# Patient Record
Sex: MALE | Race: WHITE | URBAN - METROPOLITAN AREA
[De-identification: names, ages, dates, MRNs, and addresses within clinical notes are randomized per-mention and may not be internally consistent; named-entity substitution may affect disease eponyms.]

---

## 2023-09-04 ENCOUNTER — HOSPITAL ENCOUNTER (EMERGENCY)
Age: 43
Discharge: HOME | End: 2023-09-04
Attending: EMERGENCY MEDICINE
Payer: COMMERCIAL

## 2023-09-04 ENCOUNTER — APPOINTMENT (OUTPATIENT)
Dept: GENERAL RADIOLOGY | Age: 43
End: 2023-09-04
Payer: COMMERCIAL

## 2023-09-04 VITALS
DIASTOLIC BLOOD PRESSURE: 87 MMHG | HEART RATE: 67 BPM | BODY MASS INDEX: 19.62 KG/M2 | OXYGEN SATURATION: 97 % | SYSTOLIC BLOOD PRESSURE: 106 MMHG | RESPIRATION RATE: 17 BRPM | HEIGHT: 67 IN | WEIGHT: 125 LBS | TEMPERATURE: 98.2 F

## 2023-09-04 DIAGNOSIS — R10.13 ABDOMINAL PAIN, EPIGASTRIC: Primary | ICD-10-CM

## 2023-09-04 LAB
ALBUMIN SERPL-MCNC: 4 G/DL (ref 3.5–5)
ALBUMIN/GLOB SERPL: 1.1 (ref 0.4–1.6)
ALP SERPL-CCNC: 62 U/L (ref 50–136)
ALT SERPL-CCNC: 25 U/L (ref 12–65)
ANION GAP SERPL CALC-SCNC: 5 MMOL/L (ref 2–11)
AST SERPL-CCNC: 18 U/L (ref 15–37)
BASOPHILS # BLD: 0.1 K/UL (ref 0–0.2)
BASOPHILS NFR BLD: 1 % (ref 0–2)
BILIRUB SERPL-MCNC: 0.4 MG/DL (ref 0.2–1.1)
BUN SERPL-MCNC: 8 MG/DL (ref 6–23)
CALCIUM SERPL-MCNC: 9.4 MG/DL (ref 8.3–10.4)
CHLORIDE SERPL-SCNC: 106 MMOL/L (ref 101–110)
CO2 SERPL-SCNC: 26 MMOL/L (ref 21–32)
CREAT SERPL-MCNC: 0.9 MG/DL (ref 0.8–1.5)
DIFFERENTIAL METHOD BLD: ABNORMAL
EOSINOPHIL # BLD: 0.1 K/UL (ref 0–0.8)
EOSINOPHIL NFR BLD: 1 % (ref 0.5–7.8)
ERYTHROCYTE [DISTWIDTH] IN BLOOD BY AUTOMATED COUNT: 12.1 % (ref 11.9–14.6)
GLOBULIN SER CALC-MCNC: 3.7 G/DL (ref 2.8–4.5)
GLUCOSE SERPL-MCNC: 110 MG/DL (ref 65–100)
HCT VFR BLD AUTO: 48.2 % (ref 41.1–50.3)
HGB BLD-MCNC: 16.4 G/DL (ref 13.6–17.2)
IMM GRANULOCYTES # BLD AUTO: 0 K/UL (ref 0–0.5)
IMM GRANULOCYTES NFR BLD AUTO: 0 % (ref 0–5)
LIPASE SERPL-CCNC: 129 U/L (ref 73–393)
LYMPHOCYTES # BLD: 1.7 K/UL (ref 0.5–4.6)
LYMPHOCYTES NFR BLD: 16 % (ref 13–44)
MCH RBC QN AUTO: 31.6 PG (ref 26.1–32.9)
MCHC RBC AUTO-ENTMCNC: 34 G/DL (ref 31.4–35)
MCV RBC AUTO: 92.9 FL (ref 82–102)
MONOCYTES # BLD: 0.9 K/UL (ref 0.1–1.3)
MONOCYTES NFR BLD: 8 % (ref 4–12)
NEUTS SEG # BLD: 7.7 K/UL (ref 1.7–8.2)
NEUTS SEG NFR BLD: 74 % (ref 43–78)
NRBC # BLD: 0 K/UL (ref 0–0.2)
PLATELET # BLD AUTO: 218 K/UL (ref 150–450)
PMV BLD AUTO: 9.1 FL (ref 9.4–12.3)
POTASSIUM SERPL-SCNC: 4.2 MMOL/L (ref 3.5–5.1)
PROT SERPL-MCNC: 7.7 G/DL (ref 6.3–8.2)
RBC # BLD AUTO: 5.19 M/UL (ref 4.23–5.6)
SODIUM SERPL-SCNC: 137 MMOL/L (ref 133–143)
TROPONIN I SERPL HS-MCNC: 12.3 PG/ML (ref 0–14)
TROPONIN I SERPL HS-MCNC: 9.7 PG/ML (ref 0–14)
WBC # BLD AUTO: 10.5 K/UL (ref 4.3–11.1)

## 2023-09-04 PROCEDURE — 6360000002 HC RX W HCPCS

## 2023-09-04 PROCEDURE — 85025 COMPLETE CBC W/AUTO DIFF WBC: CPT

## 2023-09-04 PROCEDURE — 96374 THER/PROPH/DIAG INJ IV PUSH: CPT

## 2023-09-04 PROCEDURE — A4216 STERILE WATER/SALINE, 10 ML: HCPCS

## 2023-09-04 PROCEDURE — C9113 INJ PANTOPRAZOLE SODIUM, VIA: HCPCS

## 2023-09-04 PROCEDURE — 6370000000 HC RX 637 (ALT 250 FOR IP)

## 2023-09-04 PROCEDURE — 73630 X-RAY EXAM OF FOOT: CPT

## 2023-09-04 PROCEDURE — 71046 X-RAY EXAM CHEST 2 VIEWS: CPT

## 2023-09-04 PROCEDURE — 80053 COMPREHEN METABOLIC PANEL: CPT

## 2023-09-04 PROCEDURE — 99285 EMERGENCY DEPT VISIT HI MDM: CPT

## 2023-09-04 PROCEDURE — 2580000003 HC RX 258

## 2023-09-04 PROCEDURE — 83690 ASSAY OF LIPASE: CPT

## 2023-09-04 PROCEDURE — 84484 ASSAY OF TROPONIN QUANT: CPT

## 2023-09-04 RX ORDER — PANTOPRAZOLE SODIUM 40 MG/1
40 TABLET, DELAYED RELEASE ORAL
Qty: 60 TABLET | Refills: 0 | Status: SHIPPED | OUTPATIENT
Start: 2023-09-04

## 2023-09-04 RX ORDER — LIDOCAINE HYDROCHLORIDE 20 MG/ML
15 SOLUTION OROPHARYNGEAL
Status: COMPLETED | OUTPATIENT
Start: 2023-09-04 | End: 2023-09-04

## 2023-09-04 RX ORDER — SUCRALFATE 1 G/1
1 TABLET ORAL 4 TIMES DAILY
Qty: 120 TABLET | Refills: 0 | Status: SHIPPED | OUTPATIENT
Start: 2023-09-04

## 2023-09-04 RX ORDER — MAGNESIUM HYDROXIDE/ALUMINUM HYDROXICE/SIMETHICONE 120; 1200; 1200 MG/30ML; MG/30ML; MG/30ML
30 SUSPENSION ORAL
Status: COMPLETED | OUTPATIENT
Start: 2023-09-04 | End: 2023-09-04

## 2023-09-04 RX ADMIN — LIDOCAINE HYDROCHLORIDE 15 ML: 20 SOLUTION ORAL; TOPICAL at 13:54

## 2023-09-04 RX ADMIN — ALUMINUM HYDROXIDE, MAGNESIUM HYDROXIDE, AND SIMETHICONE 30 ML: 200; 200; 20 SUSPENSION ORAL at 13:54

## 2023-09-04 RX ADMIN — HYOSCYAMINE SULFATE 125 MCG: 0.12 TABLET ORAL; SUBLINGUAL at 14:25

## 2023-09-04 RX ADMIN — SODIUM CHLORIDE 40 MG: 9 INJECTION INTRAMUSCULAR; INTRAVENOUS; SUBCUTANEOUS at 13:55

## 2023-09-04 ASSESSMENT — ENCOUNTER SYMPTOMS
NAUSEA: 1
COLOR CHANGE: 0
CHEST TIGHTNESS: 0
DIARRHEA: 0
VOMITING: 1
SHORTNESS OF BREATH: 0
WHEEZING: 0
ABDOMINAL PAIN: 1

## 2023-09-04 ASSESSMENT — PAIN SCALES - GENERAL
PAINLEVEL_OUTOF10: 8
PAINLEVEL_OUTOF10: 6
PAINLEVEL_OUTOF10: 6

## 2023-09-04 ASSESSMENT — PAIN - FUNCTIONAL ASSESSMENT: PAIN_FUNCTIONAL_ASSESSMENT: 0-10

## 2023-09-04 ASSESSMENT — LIFESTYLE VARIABLES
HOW OFTEN DO YOU HAVE A DRINK CONTAINING ALCOHOL: 2-3 TIMES A WEEK
HOW MANY STANDARD DRINKS CONTAINING ALCOHOL DO YOU HAVE ON A TYPICAL DAY: 5 OR 6

## 2023-09-04 ASSESSMENT — PAIN DESCRIPTION - DESCRIPTORS: DESCRIPTORS: ACHING;SHOOTING;SHARP

## 2023-09-04 ASSESSMENT — PAIN DESCRIPTION - LOCATION
LOCATION: CHEST
LOCATION: ABDOMEN
LOCATION: ABDOMEN

## 2023-09-04 NOTE — ED TRIAGE NOTES
Pt ambulatory to triage with CO epigastric pain x 3 days. Reports nausea. Denies cardiac hx.  Denies fevers

## 2023-09-04 NOTE — ED PROVIDER NOTES
Emergency Department Provider Note       PCP: No primary care provider on file. Age: 43 y.o. Sex: male     DISPOSITION Decision To Discharge 09/04/2023 03:29:12 PM       ICD-10-CM    1. Abdominal pain, epigastric  R10.13 Jose Maria Smith MD, Gastroenterology, [facility] Decision Making     Complexity of Problems Addressed:  1 stable acute illness    Data Reviewed and Analyzed:   I independently ordered and reviewed each unique test.         I independently ordered and interpreted the ED EKG in the absence of a Cardiologist.    Rate: 76  EKG Interpretation: EKG Interpretation: sinus rhythm, no evidence of arrhythmia  ST Segments: Normal ST segments - NO STEMI  I interpreted the X-rays no acute findings. Discussion of management or test interpretation. 59-year-old male presenting with persistent epigastric/chest pain for the past several days. Some associated nausea and vomiting. Pain worse with meals. No exertional component. Denies any associated shortness of breath, abdominal pain otherwise. History of tobacco and alcohol use. Patient vitally stable. He does have some tenderness over the epigastrium without rebound or guarding. I suspect his symptoms are gastrointestinal in nature, however, due to proximity, will initiate cardiac work-up. Patient also endorsing some discomfort and swelling noted to the right lateral MTP joint of the right foot. Unsure of prior injuries. He does have some swelling at this point but no step-offs or crepitus. We will check an x-ray. Labs ultimately reassuring. Serial troponins are negative. Lipase within normal limits. LFTs within normal limits. Patient does report significant improvement in symptoms after GI cocktail and pantoprazole in the ED. X-ray of the foot did not reveal any osseous abnormalities. At this time, I have low suspicion for active cardiac disease. I will refer patient to GI for EGD and further evaluation.   I have

## 2023-09-04 NOTE — DISCHARGE INSTRUCTIONS
Labs, EKG and chest x-ray are overall reassuring. There is no evidence of active cardiac disease at this time. Your signs and symptoms were thought to be attributed to a gastrointestinal problem such as gastritis or peptic ulcer disease. I have placed a referral to a gastroenterologist for further evaluation. Their office should call to schedule an appointment. In the interim, I have prescribed a course of pantoprazole (Protonix) and sucralfate (Carafate) to take daily to hopefully alleviate your symptoms. I have also provided a prescription for hyoscyamine (Levsin) to take as needed for abdominal cramping. Please return with any worsening symptoms or concerns in the interim.